# Patient Record
Sex: FEMALE | Race: WHITE | NOT HISPANIC OR LATINO | Employment: OTHER | URBAN - METROPOLITAN AREA
[De-identification: names, ages, dates, MRNs, and addresses within clinical notes are randomized per-mention and may not be internally consistent; named-entity substitution may affect disease eponyms.]

---

## 2019-08-17 ENCOUNTER — OFFICE VISIT (OUTPATIENT)
Dept: URGENT CARE | Facility: CLINIC | Age: 74
End: 2019-08-17
Payer: MEDICARE

## 2019-08-17 VITALS
HEART RATE: 78 BPM | HEIGHT: 68 IN | BODY MASS INDEX: 20.31 KG/M2 | RESPIRATION RATE: 20 BRPM | WEIGHT: 134 LBS | DIASTOLIC BLOOD PRESSURE: 78 MMHG | SYSTOLIC BLOOD PRESSURE: 149 MMHG | TEMPERATURE: 97.5 F

## 2019-08-17 DIAGNOSIS — M25.561 ACUTE PAIN OF RIGHT KNEE: Primary | ICD-10-CM

## 2019-08-17 PROCEDURE — 99203 OFFICE O/P NEW LOW 30 MIN: CPT | Performed by: PHYSICIAN ASSISTANT

## 2019-08-17 PROCEDURE — G0463 HOSPITAL OUTPT CLINIC VISIT: HCPCS | Performed by: PHYSICIAN ASSISTANT

## 2019-08-17 RX ORDER — RIZATRIPTAN BENZOATE 5 MG/1
5 TABLET ORAL ONCE AS NEEDED
COMMUNITY

## 2019-08-17 RX ORDER — ZOLPIDEM TARTRATE 5 MG/1
5 TABLET ORAL
Refills: 3 | COMMUNITY
Start: 2019-07-20

## 2019-08-17 RX ORDER — IBANDRONATE SODIUM 150 MG
TABLET ORAL
Refills: 3 | COMMUNITY
Start: 2019-07-14

## 2019-08-17 NOTE — PATIENT INSTRUCTIONS
Advil 800mg every 8 hours  May take Benadryl as directed at night  Follow up with PCP in 3-5 days  Proceed to  ER if symptoms worsen

## 2019-08-17 NOTE — PROGRESS NOTES
[facility]        NAME: Alexy Tripathi is a 75 y o  female  : 1945    MRN: [id number]  DATE: 2019  TIME: 2:35 PM    Assessment and Plan   Acute pain of right knee [M25 561]  1  Acute pain of right knee       Likely inflammatory flare from overuse while dancing  Patient Instructions     Advil 800mg every 8 hours  May take Benadryl as directed at night  Follow up with PCP in 3-5 days  Proceed to  ER if symptoms worsen  Chief Complaint     Chief Complaint   Patient presents with    Knee Pain     R knee pain started 2 days ago  Possible injury when dancing  Worse at night  Tried Tylenol and [employer]     History of Present Illness       Pt is a 75 yr old female here with her  for right knee pain that began 2 days ago  She was dancing a lot the day prior  She denies any known injury  Pain is at a 2-3 consistently and she feels more of an ache at night that is giving her trouble sleeping  No swelling, bruising, warmth, numbness, tingling  She has been taking Tylenol without relief and using salonpas  They are leaving for a cruise tomorrow after looking for something to help  Review of Systems   Review of Systems   Constitutional: Negative for chills, fatigue and fever  Musculoskeletal: Positive for arthralgias  Negative for gait problem, joint swelling and myalgias  Skin: Negative for rash and wound  Neurological: Negative for numbness           Current Medications       Current Outpatient Medications:     rizatriptan (MAXALT) 5 MG tablet, Take 5 mg by mouth once as needed for migraine May repeat in 2 hours if needed, Disp: , Rfl:     BONIVA 150 MG tablet, TAKE 1 TABLET BY MOUTH EVERY 30 DAYS WITH A FULL GLASS OF WATER, Disp: , Rfl: 3    zolpidem (AMBIEN) 5 mg tablet, Take 5 mg by mouth daily at bedtime as needed, Disp: , Rfl: 3    Current Allergies     Allergies as of 2019    (No Known Allergies)            The following portions of the patient's history were reviewed and updated as appropriate: allergies, current medications, past family history, past medical history, past social history, past surgical history and problem list      Past Medical History:   Diagnosis Date    Migraines     Osteoporosis        Past Surgical History:   Procedure Laterality Date    HYSTERECTOMY         No family history on file  Medications have been verified  Objective   /78 (Patient Position: Sitting)   Pulse 78   Temp 97 5 °F (36 4 °C) (Temporal)   Resp 20   Ht 5' 8" (1 727 m)   Wt 60 8 kg (134 lb)   BMI 20 37 kg/m²        Physical Exam     Physical Exam   Constitutional: She is oriented to person, place, and time  She appears well-developed and well-nourished  No distress  HENT:   Head: Normocephalic and atraumatic  Eyes: Conjunctivae are normal    Pulmonary/Chest: Effort normal    Musculoskeletal:        Right knee: She exhibits normal range of motion, no swelling, no effusion, no ecchymosis, no deformity, no laceration, no erythema, normal alignment and no LCL laxity  No tenderness found  Pain located deep inside knee  Not tender to palpation   Neurological: She is alert and oriented to person, place, and time  Skin: Skin is warm and dry  She is not diaphoretic  Psychiatric: She has a normal mood and affect   Her behavior is normal